# Patient Record
Sex: MALE | Race: WHITE | Employment: UNEMPLOYED | URBAN - METROPOLITAN AREA
[De-identification: names, ages, dates, MRNs, and addresses within clinical notes are randomized per-mention and may not be internally consistent; named-entity substitution may affect disease eponyms.]

---
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
## 2021-07-16 ENCOUNTER — HOSPITAL ENCOUNTER (OUTPATIENT)
Dept: LAB | Age: 21
Discharge: HOME | End: 2021-07-16
Payer: MEDICAID

## 2021-07-16 LAB
ALBUMIN SERPL-MCNC: 4.4 G/DL (ref 3.4–5)
ALBUMIN/GLOB SERPL: 1.2 {RATIO} (ref 0.8–1.7)
ALP SERPL-CCNC: 94 U/L (ref 45–117)
ALT SERPL-CCNC: 30 U/L (ref 16–61)
ANION GAP SERPL CALC-SCNC: 6 MMOL/L (ref 3–18)
AST SERPL-CCNC: 14 U/L (ref 10–38)
BILIRUB SERPL-MCNC: 0.7 MG/DL (ref 0.2–1)
BUN SERPL-MCNC: 12 MG/DL (ref 7–18)
BUN/CREAT SERPL: 13 (ref 12–20)
CALCIUM SERPL-MCNC: 9.6 MG/DL (ref 8.5–10.1)
CHLORIDE SERPL-SCNC: 105 MMOL/L (ref 100–111)
CO2 SERPL-SCNC: 28 MMOL/L (ref 21–32)
CREAT SERPL-MCNC: 0.96 MG/DL (ref 0.6–1.3)
ERYTHROCYTE [DISTWIDTH] IN BLOOD BY AUTOMATED COUNT: 12.5 % (ref 11.6–14.5)
GLOBULIN SER CALC-MCNC: 3.8 G/DL (ref 2–4)
GLUCOSE SERPL-MCNC: 95 MG/DL (ref 74–99)
HCT VFR BLD AUTO: 46.9 % (ref 36–48)
HGB BLD-MCNC: 16.2 G/DL (ref 13–16)
MCH RBC QN AUTO: 29.8 PG (ref 24–34)
MCHC RBC AUTO-ENTMCNC: 34.5 G/DL (ref 31–37)
MCV RBC AUTO: 86.4 FL (ref 74–97)
PLATELET # BLD AUTO: 346 K/UL (ref 135–420)
PMV BLD AUTO: 10.5 FL (ref 9.2–11.8)
POTASSIUM SERPL-SCNC: 4 MMOL/L (ref 3.5–5.5)
PROT SERPL-MCNC: 8.2 G/DL (ref 6.4–8.2)
RBC # BLD AUTO: 5.43 M/UL (ref 4.35–5.65)
SODIUM SERPL-SCNC: 139 MMOL/L (ref 136–145)
WBC # BLD AUTO: 7.6 K/UL (ref 4.6–13.2)

## 2021-07-16 PROCEDURE — 36415 COLL VENOUS BLD VENIPUNCTURE: CPT

## 2021-07-16 PROCEDURE — 85027 COMPLETE CBC AUTOMATED: CPT

## 2021-07-16 PROCEDURE — 80175 DRUG SCREEN QUAN LAMOTRIGINE: CPT

## 2021-07-16 PROCEDURE — 80053 COMPREHEN METABOLIC PANEL: CPT

## 2021-07-19 LAB — LAMOTRIGINE SERPL-MCNC: 3.5 UG/ML (ref 2–20)

## 2021-10-15 ENCOUNTER — HOSPITAL ENCOUNTER (OUTPATIENT)
Dept: LAB | Age: 21
Discharge: HOME | End: 2021-10-15
Payer: MEDICAID

## 2021-10-15 LAB
CHOLEST SERPL-MCNC: 214 MG/DL
GLUCOSE SERPL-MCNC: 90 MG/DL (ref 74–99)
HDLC SERPL-MCNC: 43 MG/DL (ref 40–60)
HDLC SERPL: 5 {RATIO} (ref 0–5)
LDLC SERPL CALC-MCNC: 129.2 MG/DL (ref 0–100)
LIPID PROFILE,FLP: ABNORMAL
TRIGL SERPL-MCNC: 209 MG/DL (ref ?–150)
VLDLC SERPL CALC-MCNC: 41.8 MG/DL

## 2021-10-15 PROCEDURE — 80061 LIPID PANEL: CPT

## 2021-10-15 PROCEDURE — 36415 COLL VENOUS BLD VENIPUNCTURE: CPT

## 2021-10-15 PROCEDURE — 82947 ASSAY GLUCOSE BLOOD QUANT: CPT

## 2021-11-12 ENCOUNTER — APPOINTMENT (OUTPATIENT)
Dept: CT IMAGING | Age: 21
End: 2021-11-12
Attending: EMERGENCY MEDICINE
Payer: MEDICAID

## 2021-11-12 ENCOUNTER — HOSPITAL ENCOUNTER (EMERGENCY)
Age: 21
Discharge: HOME | End: 2021-11-13
Attending: EMERGENCY MEDICINE
Payer: MEDICAID

## 2021-11-12 VITALS
DIASTOLIC BLOOD PRESSURE: 72 MMHG | TEMPERATURE: 97 F | RESPIRATION RATE: 18 BRPM | SYSTOLIC BLOOD PRESSURE: 114 MMHG | OXYGEN SATURATION: 95 % | HEART RATE: 121 BPM

## 2021-11-12 DIAGNOSIS — S00.83XA CONTUSION OF FACE, INITIAL ENCOUNTER: ICD-10-CM

## 2021-11-12 DIAGNOSIS — G40.909 SEIZURE DISORDER (HCC): Primary | ICD-10-CM

## 2021-11-12 LAB
ALBUMIN SERPL-MCNC: 4.2 G/DL (ref 3.4–5)
ALBUMIN/GLOB SERPL: 1.2 {RATIO} (ref 0.8–1.7)
ALP SERPL-CCNC: 86 U/L (ref 45–117)
ALT SERPL-CCNC: 34 U/L (ref 16–61)
ANION GAP SERPL CALC-SCNC: 11 MMOL/L (ref 3–18)
AST SERPL-CCNC: 15 U/L (ref 10–38)
BASOPHILS # BLD: 0.1 K/UL (ref 0–0.1)
BASOPHILS NFR BLD: 1 % (ref 0–2)
BILIRUB SERPL-MCNC: 0.4 MG/DL (ref 0.2–1)
BUN SERPL-MCNC: 13 MG/DL (ref 7–18)
BUN/CREAT SERPL: 12 (ref 12–20)
CALCIUM SERPL-MCNC: 9 MG/DL (ref 8.5–10.1)
CHLORIDE SERPL-SCNC: 104 MMOL/L (ref 100–111)
CO2 SERPL-SCNC: 22 MMOL/L (ref 21–32)
CREAT SERPL-MCNC: 1.06 MG/DL (ref 0.6–1.3)
DIFFERENTIAL METHOD BLD: ABNORMAL
EOSINOPHIL # BLD: 0.7 K/UL (ref 0–0.4)
EOSINOPHIL NFR BLD: 6 % (ref 0–5)
ERYTHROCYTE [DISTWIDTH] IN BLOOD BY AUTOMATED COUNT: 12.3 % (ref 11.6–14.5)
GLOBULIN SER CALC-MCNC: 3.5 G/DL (ref 2–4)
GLUCOSE SERPL-MCNC: 143 MG/DL (ref 74–99)
HCT VFR BLD AUTO: 42.8 % (ref 36–48)
HGB BLD-MCNC: 14.6 G/DL (ref 13–16)
IMM GRANULOCYTES # BLD AUTO: 0.1 K/UL (ref 0–0.04)
IMM GRANULOCYTES NFR BLD AUTO: 1 % (ref 0–0.5)
LYMPHOCYTES # BLD: 3.6 K/UL (ref 0.9–3.6)
LYMPHOCYTES NFR BLD: 34 % (ref 21–52)
MCH RBC QN AUTO: 29.6 PG (ref 24–34)
MCHC RBC AUTO-ENTMCNC: 34.1 G/DL (ref 31–37)
MCV RBC AUTO: 86.8 FL (ref 78–100)
MONOCYTES # BLD: 1 K/UL (ref 0.05–1.2)
MONOCYTES NFR BLD: 10 % (ref 3–10)
NEUTS SEG # BLD: 5.1 K/UL (ref 1.8–8)
NEUTS SEG NFR BLD: 48 % (ref 40–73)
NRBC # BLD: 0 K/UL (ref 0–0.01)
NRBC BLD-RTO: 0 PER 100 WBC
PLATELET # BLD AUTO: 336 K/UL (ref 135–420)
PMV BLD AUTO: 10.3 FL (ref 9.2–11.8)
POTASSIUM SERPL-SCNC: 3.6 MMOL/L (ref 3.5–5.5)
PROT SERPL-MCNC: 7.7 G/DL (ref 6.4–8.2)
RBC # BLD AUTO: 4.93 M/UL (ref 4.35–5.65)
SODIUM SERPL-SCNC: 137 MMOL/L (ref 136–145)
WBC # BLD AUTO: 10.6 K/UL (ref 4.6–13.2)

## 2021-11-12 PROCEDURE — 72125 CT NECK SPINE W/O DYE: CPT

## 2021-11-12 PROCEDURE — 80053 COMPREHEN METABOLIC PANEL: CPT

## 2021-11-12 PROCEDURE — 70450 CT HEAD/BRAIN W/O DYE: CPT

## 2021-11-12 PROCEDURE — 85025 COMPLETE CBC W/AUTO DIFF WBC: CPT

## 2021-11-12 PROCEDURE — 99282 EMERGENCY DEPT VISIT SF MDM: CPT

## 2021-11-12 RX ORDER — ACETAMINOPHEN 325 MG/1
650 TABLET ORAL
COMMUNITY

## 2021-11-12 RX ORDER — CETIRIZINE HCL 10 MG
10 TABLET ORAL DAILY
COMMUNITY

## 2021-11-12 RX ORDER — LORAZEPAM 2 MG/ML
0.5 INJECTION INTRAMUSCULAR
Status: DISCONTINUED | OUTPATIENT
Start: 2021-11-12 | End: 2021-11-13 | Stop reason: HOSPADM

## 2021-11-12 RX ORDER — HYDROXYZINE 50 MG/1
50 TABLET, FILM COATED ORAL
COMMUNITY

## 2021-11-12 RX ORDER — DIAZEPAM 10 MG/2ML
10 GEL RECTAL AS NEEDED
COMMUNITY

## 2021-11-12 RX ORDER — ADHESIVE BANDAGE
30 BANDAGE TOPICAL AS NEEDED
COMMUNITY

## 2021-11-12 RX ORDER — BISACODYL 5 MG
5 TABLET, DELAYED RELEASE (ENTERIC COATED) ORAL DAILY PRN
COMMUNITY

## 2021-11-12 RX ORDER — IBUPROFEN 400 MG/1
400 TABLET ORAL
COMMUNITY

## 2021-11-12 RX ORDER — POLYETHYLENE GLYCOL 3350 17 G/17G
17 POWDER, FOR SOLUTION ORAL 2 TIMES DAILY
COMMUNITY

## 2021-11-12 RX ORDER — FACIAL-BODY WIPES
10 EACH TOPICAL AS NEEDED
COMMUNITY

## 2021-11-13 NOTE — ED TRIAGE NOTES
Patient arrived with caregivers from [facility]. Caregivers report patient stood up to go to his room, had a seizure, falling and struck hs hear on the arm of the chair. Caregivers reported seizure lasted approx 2 minutes. Patient has small laceration to left eyebrow, no active bleeding at this time.

## 2021-11-13 NOTE — ED PROVIDER NOTES
HPI Patient is a 21 yo male who arrived with caregivers from [address] [place] [address] (group [place]). Caregivers report patient stood up to go to his room, had a seizure, falling and struck hs head on the arm of a chair. Caregivers reported seizure lasted approx 2 minutes. Patient has small laceration to left eyebrow, no active bleeding at this time. Past Medical History:   Diagnosis Date    Autism     Epilepsy ([facility] [address]        No past surgical history on file. No family history on file. Social History     Socioeconomic History    Marital status: SINGLE     Spouse name: Not on file    Number of children: Not on file    Years of education: Not on file    Highest education level: Not on file   Occupational History    Not on file   Tobacco Use    Smoking status: Not on file    Smokeless tobacco: Not on file   Substance and Sexual Activity    Alcohol use: Not on file    Drug use: Not on file    Sexual activity: Not on file   Other Topics Concern    Not on file   Social History Narrative    Not on file     Social Determinants of Health     Financial Resource Strain:     Difficulty of Paying Living Expenses: Not on file   Food Insecurity:     Worried About Running Out of Food in the Last Year: Not on file    Nilam of Food in the Last Year: Not on file   Transportation Needs:     Lack of Transportation (Medical): Not on file    Lack of Transportation (Non-Medical):  Not on file   Physical Activity:     Days of Exercise per Week: Not on file    Minutes of Exercise per Session: Not on file   Stress:     Feeling of Stress : Not on file   Social Connections:     Frequency of Communication with Friends and Family: Not on file    Frequency of Social Gatherings with Friends and Family: Not on file    Attends Taoism Services: Not on file    Active Member of Clubs or Organizations: Not on file    Attends Club or Organization Meetings: Not on file    Marital Status: Not on file   Intimate Partner Violence:  Fear of Current or Ex-Partner: Not on file    Emotionally Abused: Not on file    Physically Abused: Not on file    Sexually Abused: Not on file   Housing Stability:     Unable to Pay for Housing in the Last Year: Not on file    Number of Places Lived in the Last Year: Not on file    Unstable Housing in the Last Year: Not on file         ALLERGIES: Sams, Peas, and Shellfish derived    Review of Systems   Reason unable to perform ROS: Autism. Vitals:    11/12/21 2230   BP: 114/72   Pulse: (!) 121   Resp: 18   SpO2: 95%            Physical Exam  Vitals and nursing note reviewed. Constitutional:       General: He is not in acute distress. Appearance: He is well-developed. HENT:      Head: Normocephalic. Eyes:      Conjunctiva/sclera: Conjunctivae normal.      Pupils: Pupils are equal, round, and reactive to light. Cardiovascular:      Rate and Rhythm: Normal rate and regular rhythm. Heart sounds: Normal heart sounds. No murmur heard. Pulmonary:      Effort: Pulmonary effort is normal. No respiratory distress. Breath sounds: Normal breath sounds. No wheezing or rales. Chest:      Chest wall: No tenderness. Abdominal:      General: Bowel sounds are normal. There is no distension. Palpations: Abdomen is soft. Tenderness: There is no abdominal tenderness. There is no rebound. Musculoskeletal:         General: No tenderness. Normal range of motion. Cervical back: Normal range of motion and neck supple. Skin:     General: Skin is warm and dry. Findings: No rash. Neurological:      Mental Status: He is alert. Cranial Nerves: No cranial nerve deficit. Motor: No abnormal muscle tone. Coordination: Coordination normal.   Psychiatric:         Behavior: Behavior normal.         Thought Content:  Thought content normal.         Judgment: Judgment normal.          [facility]       CT scan of head and neck: interpreted by me      Procedures    Dx: seizure disorder, contusion of face    Disp: D/C home. Motrin 600 mg po q 6 h pr pain. Return ot ER prn. Dictation disclaimer:  Please note that this dictation was completed with [employer], the computer voice recognition software. Quite often unanticipated grammatical, syntax, homophones, and other interpretive errors are inadvertently transcribed by the computer software. Please disregard these errors. Please excuse any errors that have escaped final proofreading.

## 2022-01-19 ENCOUNTER — HOSPITAL ENCOUNTER (OUTPATIENT)
Dept: LAB | Age: 22
Discharge: HOME | End: 2022-01-19
Payer: MEDICAID

## 2022-01-19 PROCEDURE — 80175 DRUG SCREEN QUAN LAMOTRIGINE: CPT

## 2022-01-19 PROCEDURE — 36415 COLL VENOUS BLD VENIPUNCTURE: CPT

## 2022-01-21 LAB — LAMOTRIGINE SERPL-MCNC: 8.1 UG/ML (ref 2–20)

## 2022-04-14 ENCOUNTER — HOSPITAL ENCOUNTER (OUTPATIENT)
Dept: LAB | Age: 22
Discharge: HOME | End: 2022-04-14
Payer: MEDICAID

## 2022-04-14 LAB
CREAT SERPL-MCNC: 0.96 MG/DL (ref 0.6–1.3)
GLUCOSE SERPL-MCNC: 90 MG/DL (ref 74–99)

## 2022-04-14 PROCEDURE — 36415 COLL VENOUS BLD VENIPUNCTURE: CPT

## 2022-04-14 PROCEDURE — 82947 ASSAY GLUCOSE BLOOD QUANT: CPT

## 2022-05-13 ENCOUNTER — APPOINTMENT (OUTPATIENT)
Dept: GENERAL RADIOLOGY | Age: 22
End: 2022-05-13
Attending: STUDENT IN AN ORGANIZED HEALTH CARE EDUCATION/TRAINING PROGRAM
Payer: MEDICAID

## 2022-05-13 ENCOUNTER — HOSPITAL ENCOUNTER (EMERGENCY)
Age: 22
Discharge: HOME | End: 2022-05-13
Attending: STUDENT IN AN ORGANIZED HEALTH CARE EDUCATION/TRAINING PROGRAM
Payer: MEDICAID

## 2022-05-13 ENCOUNTER — APPOINTMENT (OUTPATIENT)
Dept: CT IMAGING | Age: 22
End: 2022-05-13
Attending: STUDENT IN AN ORGANIZED HEALTH CARE EDUCATION/TRAINING PROGRAM
Payer: MEDICAID

## 2022-05-13 VITALS
DIASTOLIC BLOOD PRESSURE: 64 MMHG | BODY MASS INDEX: 21.97 KG/M2 | WEIGHT: 140 LBS | RESPIRATION RATE: 14 BRPM | HEIGHT: 67 IN | TEMPERATURE: 97.3 F | HEART RATE: 78 BPM | SYSTOLIC BLOOD PRESSURE: 109 MMHG | OXYGEN SATURATION: 96 %

## 2022-05-13 DIAGNOSIS — R56.9 SEIZURE (HCC): Primary | ICD-10-CM

## 2022-05-13 DIAGNOSIS — S01.81XA FACIAL LACERATION, INITIAL ENCOUNTER: ICD-10-CM

## 2022-05-13 LAB
ALBUMIN SERPL-MCNC: 3.7 G/DL (ref 3.4–5)
ALBUMIN/GLOB SERPL: 1 {RATIO} (ref 0.8–1.7)
ALP SERPL-CCNC: 83 U/L (ref 45–117)
ALT SERPL-CCNC: 32 U/L (ref 16–61)
ANION GAP SERPL CALC-SCNC: 7 MMOL/L (ref 3–18)
AST SERPL-CCNC: 16 U/L (ref 10–38)
BASOPHILS # BLD: 0.1 K/UL (ref 0–0.1)
BASOPHILS NFR BLD: 2 % (ref 0–2)
BILIRUB SERPL-MCNC: 0.3 MG/DL (ref 0.2–1)
BUN SERPL-MCNC: 12 MG/DL (ref 7–18)
BUN/CREAT SERPL: 11 (ref 12–20)
CALCIUM SERPL-MCNC: 9.2 MG/DL (ref 8.5–10.1)
CHLORIDE SERPL-SCNC: 108 MMOL/L (ref 100–111)
CO2 SERPL-SCNC: 24 MMOL/L (ref 21–32)
CREAT SERPL-MCNC: 1.13 MG/DL (ref 0.6–1.3)
DIFFERENTIAL METHOD BLD: ABNORMAL
EOSINOPHIL # BLD: 0.7 K/UL (ref 0–0.4)
EOSINOPHIL NFR BLD: 9 % (ref 0–5)
ERYTHROCYTE [DISTWIDTH] IN BLOOD BY AUTOMATED COUNT: 12.7 % (ref 11.6–14.5)
GLOBULIN SER CALC-MCNC: 3.7 G/DL (ref 2–4)
GLUCOSE BLD STRIP.AUTO-MCNC: 107 MG/DL (ref 70–110)
GLUCOSE SERPL-MCNC: 108 MG/DL (ref 74–99)
HCT VFR BLD AUTO: 43.2 % (ref 36–48)
HGB BLD-MCNC: 14.9 G/DL (ref 13–16)
IMM GRANULOCYTES # BLD AUTO: 0.1 K/UL (ref 0–0.04)
IMM GRANULOCYTES NFR BLD AUTO: 1 % (ref 0–0.5)
LACTATE SERPL-SCNC: 4.4 MMOL/L (ref 0.4–2)
LYMPHOCYTES # BLD: 2.9 K/UL (ref 0.9–3.6)
LYMPHOCYTES NFR BLD: 34 % (ref 21–52)
MAGNESIUM SERPL-MCNC: 2.3 MG/DL (ref 1.6–2.6)
MCH RBC QN AUTO: 29.8 PG (ref 24–34)
MCHC RBC AUTO-ENTMCNC: 34.5 G/DL (ref 31–37)
MCV RBC AUTO: 86.4 FL (ref 78–100)
MONOCYTES # BLD: 0.9 K/UL (ref 0.05–1.2)
MONOCYTES NFR BLD: 10 % (ref 3–10)
NEUTS SEG # BLD: 3.8 K/UL (ref 1.8–8)
NEUTS SEG NFR BLD: 45 % (ref 40–73)
NRBC # BLD: 0 K/UL (ref 0–0.01)
NRBC BLD-RTO: 0 PER 100 WBC
PLATELET # BLD AUTO: 322 K/UL (ref 135–420)
PMV BLD AUTO: 10.4 FL (ref 9.2–11.8)
POTASSIUM SERPL-SCNC: 3.6 MMOL/L (ref 3.5–5.5)
PROT SERPL-MCNC: 7.4 G/DL (ref 6.4–8.2)
RBC # BLD AUTO: 5 M/UL (ref 4.35–5.65)
SODIUM SERPL-SCNC: 139 MMOL/L (ref 136–145)
WBC # BLD AUTO: 8.5 K/UL (ref 4.6–13.2)

## 2022-05-13 PROCEDURE — 83605 ASSAY OF LACTIC ACID: CPT

## 2022-05-13 PROCEDURE — 82962 GLUCOSE BLOOD TEST: CPT

## 2022-05-13 PROCEDURE — 70450 CT HEAD/BRAIN W/O DYE: CPT

## 2022-05-13 PROCEDURE — 74011000258 HC RX REV CODE- 258: Performed by: STUDENT IN AN ORGANIZED HEALTH CARE EDUCATION/TRAINING PROGRAM

## 2022-05-13 PROCEDURE — 74011000250 HC RX REV CODE- 250: Performed by: STUDENT IN AN ORGANIZED HEALTH CARE EDUCATION/TRAINING PROGRAM

## 2022-05-13 PROCEDURE — 80175 DRUG SCREEN QUAN LAMOTRIGINE: CPT

## 2022-05-13 PROCEDURE — 99284 EMERGENCY DEPT VISIT MOD MDM: CPT

## 2022-05-13 PROCEDURE — 74011250636 HC RX REV CODE- 250/636: Performed by: STUDENT IN AN ORGANIZED HEALTH CARE EDUCATION/TRAINING PROGRAM

## 2022-05-13 PROCEDURE — 96366 THER/PROPH/DIAG IV INF ADDON: CPT

## 2022-05-13 PROCEDURE — C9254 INJECTION, LACOSAMIDE: HCPCS | Performed by: STUDENT IN AN ORGANIZED HEALTH CARE EDUCATION/TRAINING PROGRAM

## 2022-05-13 PROCEDURE — 75810000293 HC SIMP/SUPERF WND  RPR

## 2022-05-13 PROCEDURE — 85025 COMPLETE CBC W/AUTO DIFF WBC: CPT

## 2022-05-13 PROCEDURE — 96365 THER/PROPH/DIAG IV INF INIT: CPT

## 2022-05-13 PROCEDURE — 83735 ASSAY OF MAGNESIUM: CPT

## 2022-05-13 PROCEDURE — 90471 IMMUNIZATION ADMIN: CPT

## 2022-05-13 PROCEDURE — 80053 COMPREHEN METABOLIC PANEL: CPT

## 2022-05-13 PROCEDURE — 96375 TX/PRO/DX INJ NEW DRUG ADDON: CPT

## 2022-05-13 PROCEDURE — 90715 TDAP VACCINE 7 YRS/> IM: CPT | Performed by: STUDENT IN AN ORGANIZED HEALTH CARE EDUCATION/TRAINING PROGRAM

## 2022-05-13 PROCEDURE — 70486 CT MAXILLOFACIAL W/O DYE: CPT

## 2022-05-13 PROCEDURE — 70250 X-RAY EXAM OF SKULL: CPT

## 2022-05-13 PROCEDURE — 96372 THER/PROPH/DIAG INJ SC/IM: CPT

## 2022-05-13 RX ORDER — LIDOCAINE HYDROCHLORIDE 20 MG/ML
3 INJECTION, SOLUTION INFILTRATION; PERINEURAL ONCE
Status: DISCONTINUED | OUTPATIENT
Start: 2022-05-13 | End: 2022-05-13 | Stop reason: ALTCHOICE

## 2022-05-13 RX ORDER — HALOPERIDOL 5 MG/ML
5 INJECTION INTRAMUSCULAR ONCE
Status: DISCONTINUED | OUTPATIENT
Start: 2022-05-13 | End: 2022-05-13 | Stop reason: ALTCHOICE

## 2022-05-13 RX ORDER — LORAZEPAM 2 MG/ML
2 INJECTION, SOLUTION INTRAMUSCULAR; INTRAVENOUS ONCE
Status: COMPLETED | OUTPATIENT
Start: 2022-05-13 | End: 2022-05-13

## 2022-05-13 RX ORDER — HYDROMORPHONE HYDROCHLORIDE 1 MG/ML
0.5 INJECTION, SOLUTION INTRAMUSCULAR; INTRAVENOUS; SUBCUTANEOUS ONCE
Status: COMPLETED | OUTPATIENT
Start: 2022-05-13 | End: 2022-05-13

## 2022-05-13 RX ORDER — LACOSAMIDE 10 MG/ML
200 INJECTION, SOLUTION INTRAVENOUS ONCE
Status: DISCONTINUED | OUTPATIENT
Start: 2022-05-13 | End: 2022-05-13

## 2022-05-13 RX ORDER — LACOSAMIDE 100 MG/1
100 TABLET ORAL 2 TIMES DAILY
Qty: 60 TABLET | Refills: 0 | Status: SHIPPED | OUTPATIENT
Start: 2022-05-13

## 2022-05-13 RX ORDER — ZIPRASIDONE MESYLATE 20 MG/ML
INJECTION, POWDER, LYOPHILIZED, FOR SOLUTION INTRAMUSCULAR
Status: DISCONTINUED
Start: 2022-05-13 | End: 2022-05-13 | Stop reason: HOSPADM

## 2022-05-13 RX ADMIN — LORAZEPAM 2 MG: 2 INJECTION, SOLUTION INTRAMUSCULAR; INTRAVENOUS at 08:09

## 2022-05-13 RX ADMIN — TETANUS TOXOID, REDUCED DIPHTHERIA TOXOID AND ACELLULAR PERTUSSIS VACCINE, ADSORBED 0.5 ML: 5; 2.5; 8; 8; 2.5 SUSPENSION INTRAMUSCULAR at 16:53

## 2022-05-13 RX ADMIN — Medication 2 ML: at 15:04

## 2022-05-13 RX ADMIN — ZIPRASIDONE MESYLATE 20 MG: 20 INJECTION, POWDER, LYOPHILIZED, FOR SOLUTION INTRAMUSCULAR at 15:04

## 2022-05-13 RX ADMIN — SODIUM CHLORIDE 1000 ML: 900 INJECTION, SOLUTION INTRAVENOUS at 09:15

## 2022-05-13 RX ADMIN — LIDOCAINE HYDROCHLORIDE 45 MG: 10; .005 INJECTION, SOLUTION EPIDURAL; INFILTRATION; INTRACAUDAL; PERINEURAL at 08:08

## 2022-05-13 RX ADMIN — HYDROMORPHONE HYDROCHLORIDE 0.5 MG: 1 INJECTION, SOLUTION INTRAMUSCULAR; INTRAVENOUS; SUBCUTANEOUS at 07:10

## 2022-05-13 RX ADMIN — SODIUM CHLORIDE 200 MG: 9 INJECTION, SOLUTION INTRAVENOUS at 09:30

## 2022-05-13 NOTE — ED PROVIDER NOTES
EMERGENCY DEPARTMENT HISTORY AND PHYSICAL EXAM    7:04 AM    Date: 5/13/2022  Patient Name: Shayna Greco    History of Presenting Illness     Chief Complaint   Patient presents with    Head Laceration    Seizure    Fall     History Provided By: Patient        Patient is a 22year old male with PMH Epilepsy, Severe Intellectual Disability, Autism, Hx Hydrocephalus s/p  shunt presenting for \"seizure\" lasting 1-2 minutes per [employer]. Takes Lamictal for anticonvulsants. Unfortunately patient fell backwards out of his chair and had a laceration of the right eyebrow. Patient previously saw Dr. Gaetano Leon with [facility]; however, when I called the [facility] office today informed me that they have not seen the patient since 2020 for an EEG. He is complaining of pain above his right eye. Otherwise personal history is fairly limited due to patient's medical history. No Fevers per staff. PCP: Archie Vogel MD    Current Facility-Administered Medications   Medication Dose Route Frequency Provider Last Rate Last Admin    ziprasidone (GEODON) 20 mg/mL (final conc.) injection             diph,Pertuss(AC),Tet Vac-PF (BOOSTRIX) suspension 0.5 mL  0.5 mL IntraMUSCular PRIOR TO DISCHARGE Lukieran Carrero, DO         Current Outpatient Medications   Medication Sig Dispense Refill    pedi multivit no. 58/iron fum (CHILD COMPLETE MULTIVITAMIN PO) Take  by mouth.  bisacodyL (Gentle Laxative, bisacodyl,) 5 mg EC tablet Take 5 mg by mouth daily as needed for Constipation.  polyethylene glycol (MIRALAX) 17 gram packet Take 17 g by mouth two (2) times a day.  acetaminophen (TYLENOL) 325 mg tablet Take 650 mg by mouth every four (4) hours as needed for Pain.  cetirizine (ZYRTEC) 10 mg tablet Take 10 mg by mouth daily.  diazePAM (VALIUM) 5-7.5-10 mg kit Insert 10 mg into rectum as needed.  hydrOXYzine HCL (ATARAX) 50 mg tablet Take 50 mg by mouth three (3) times daily as needed.       ibuprofen (MOTRIN) 400 mg tablet Take 400 mg by mouth every six (6) hours as needed for Pain.  magnesium hydroxide (Ahuja Milk of Magnesia) 400 mg/5 mL suspension Take 30 mL by mouth as needed for Constipation.  bisacodyL (Dulcolax, bisacodyl,) 10 mg supp Insert 10 mg into rectum as needed for Constipation.  ARIPiprazole (Abilify) 5 mg tablet Take  by mouth daily.  lamoTRIgine (LaMICtaL) 100 mg tablet Take 150 mg by mouth two (2) times a day.  QUEtiapine (SEROqueL) 400 mg tablet Take 400 mg by mouth two (2) times a day.  melatonin 1 mg tablet Take 10 mg by mouth nightly. Past History     Past Medical History:  Past Medical History:   Diagnosis Date    Autism     Epilepsy [facility])      Past Surgical History:  No past surgical history on file. Family History:  No family history on file. Social History:  Social History     Tobacco Use    Smoking status: Not on file    Smokeless tobacco: Not on file   Substance Use Topics    Alcohol use: Not on file    Drug use: Not on file       Allergies: Allergies   Allergen Reactions    Bean Anaphylaxis    Peas Anaphylaxis    Shellfish Derived Anaphylaxis         Review of Systems     Review of Systems   Constitutional: Negative for chills and fever. Respiratory: Negative for cough and shortness of breath. Cardiovascular: Negative for chest pain. Gastrointestinal: Positive for constipation. Negative for abdominal pain. Skin: Positive for wound. Neurological: Positive for seizures. Negative for speech difficulty. Physical Exam     Visit Vitals  /60 (BP 1 Location: Right upper arm, BP Patient Position: Lying)   Pulse 76   Temp 97.3 °F (36.3 °C)   Resp 18   Ht 5' 7\" (1.702 m)   Wt 63.5 kg (140 lb)   SpO2 98%   BMI 21.93 kg/m²       Physical Exam  Constitutional:       General: He is not in acute distress. Appearance: He is not ill-appearing, toxic-appearing or diaphoretic.    HENT:      Head: Normocephalic and atraumatic. Right Ear: External ear normal.      Left Ear: External ear normal.      Mouth/Throat:      Mouth: Mucous membranes are moist.      Comments: No evidence of oral lesion  Cardiovascular:      Rate and Rhythm: Normal rate and regular rhythm. Pulses: Normal pulses. Pulmonary:      Effort: Pulmonary effort is normal.      Breath sounds: Normal breath sounds. Abdominal:      General: Abdomen is flat. Musculoskeletal:         General: Normal range of motion. Cervical back: Normal range of motion. Skin:     General: Skin is warm. Capillary Refill: Capillary refill takes less than 2 seconds. Findings: Lesion (Right eyebrow approximately 3.5 cm) present. Neurological:      General: No focal deficit present. Mental Status: He is alert. Psychiatric:      Comments: Patient notes yelling out when he first arrives to the ED. Diagnostic Study Results     Labs -  Recent Results (from the past 12 hour(s))   CBC WITH AUTOMATED DIFF    Collection Time: 05/13/22  7:10 AM   Result Value Ref Range    WBC 8.5 4.6 - 13.2 K/uL    RBC 5.00 4.35 - 5.65 M/uL    HGB 14.9 13.0 - 16.0 g/dL    HCT 43.2 36.0 - 48.0 %    MCV 86.4 78.0 - 100.0 FL    MCH 29.8 24.0 - 34.0 PG    MCHC 34.5 31.0 - 37.0 g/dL    RDW 12.7 11.6 - 14.5 %    PLATELET 228 625 - 130 K/uL    MPV 10.4 9.2 - 11.8 FL    NRBC 0.0 0  WBC    ABSOLUTE NRBC 0.00 0.00 - 0.01 K/uL    NEUTROPHILS 45 40 - 73 %    LYMPHOCYTES 34 21 - 52 %    MONOCYTES 10 3 - 10 %    EOSINOPHILS 9 (H) 0 - 5 %    BASOPHILS 2 0 - 2 %    IMMATURE GRANULOCYTES 1 (H) 0.0 - 0.5 %    ABS. NEUTROPHILS 3.8 1.8 - 8.0 K/UL    ABS. LYMPHOCYTES 2.9 0.9 - 3.6 K/UL    ABS. MONOCYTES 0.9 0.05 - 1.2 K/UL    ABS. EOSINOPHILS 0.7 (H) 0.0 - 0.4 K/UL    ABS. BASOPHILS 0.1 0.0 - 0.1 K/UL    ABS. IMM.  GRANS. 0.1 (H) 0.00 - 0.04 K/UL    DF AUTOMATED     METABOLIC PANEL, COMPREHENSIVE    Collection Time: 05/13/22  7:10 AM   Result Value Ref Range    Sodium 139 136 - 145 mmol/L    Potassium 3.6 3.5 - 5.5 mmol/L    Chloride 108 100 - 111 mmol/L    CO2 24 21 - 32 mmol/L    Anion gap 7 3.0 - 18 mmol/L    Glucose 108 (H) 74 - 99 mg/dL    BUN 12 7.0 - 18 MG/DL    Creatinine 1.13 0.6 - 1.3 MG/DL    BUN/Creatinine ratio 11 (L) 12 - 20      GFR est AA >60 >60 ml/min/1.73m2    GFR est non-AA >60 >60 ml/min/1.73m2    Calcium 9.2 8.5 - 10.1 MG/DL    Bilirubin, total 0.3 0.2 - 1.0 MG/DL    ALT (SGPT) 32 16 - 61 U/L    AST (SGOT) 16 10 - 38 U/L    Alk. phosphatase 83 45 - 117 U/L    Protein, total 7.4 6.4 - 8.2 g/dL    Albumin 3.7 3.4 - 5.0 g/dL    Globulin 3.7 2.0 - 4.0 g/dL    A-G Ratio 1.0 0.8 - 1.7     MAGNESIUM    Collection Time: 05/13/22  7:10 AM   Result Value Ref Range    Magnesium 2.3 1.6 - 2.6 mg/dL   LACTIC ACID    Collection Time: 05/13/22  7:10 AM   Result Value Ref Range    Lactic acid 4.4 (HH) 0.4 - 2.0 MMOL/L   GLUCOSE, POC    Collection Time: 05/13/22  7:10 AM   Result Value Ref Range    Glucose (POC) 107 70 - 110 mg/dL       Radiologic Studies -   CT HEAD WO CONT   Final Result      Right parietal ventriculostomy catheter position similar to prior, with marked   cerebral level ventricular enlargement similar to prior.   -Small periventricular encephalomalacia and/or cysts as above also similar to   prior. No definite interval acute intracranial findings. Suspected prominent optic nerve sheaths similar to slightly increased since   prior, not well evaluated on noncontrast CT. CT MAXILLOFACIAL WO CONT   Final Result      No acute fracture identified. Right periorbital laceration. XR SHUNT SERIES   Final Result   Detached right  shunt tubing, with the shunt now entirely in the   abdomen. Medical Decision Making   I am the first provider for this patient. I reviewed the vital signs, available nursing notes, past medical history, past surgical history, family history and social history.     Vital Signs-Reviewed the patient's vital signs. Records Reviewed: Nursing Notes, Old Medical Records and Previous Radiology Studies (Time of Review: 7:04 AM)    ED Course: Progress Notes, Reevaluation, and Consults:    ED Course as of 05/13/22 1558   Fri May 13, 2022   0704 Patient with developmental delay slightly aggressive pulling at his bandage on his head and placing his finger in his laceration not allowing nursing staff to draw blood. Since patient needs lab drawn imaging done of his head and likely laceration repair will sedate with IM Haldol and Ativan. [TB]   [id number] Attempted wound closure after Ativan; however, patient is still a danger to self (and to staff) even with holding him down manually for use of needles near his eye [JK]   0900 Attempted lidocaine injection again as patient was drowsy, sleeping even with speaking his name loudly and shaking his shoulder. Unfortunately as soon as I injected he began to jerk violently. Stopped attempt for lidocaine. [JK]   46 Called CT to inform them of autism and received ativan. Patient will be transported soon. [TA]   1239 Spoke with staff at Neurology [facility]en[facility] clinic [facility]. They state he has not been seen in clinic since 2017 and that he had an EEG in 2020. Attempted to call [facility] and no answer. Left voicemail    Attempted to call PCP Dr Geri Figueroa. No answer [JK]   1138 Patient doing well. Prioritizing lamictal over fluids [JK]   1205 Discussed with [facility] transfer center. They accept. [LR]   8078 Need to ask [facility] if they are okay with suturing laceration when arrival to [facility] given he will need procedural sedation. [ZN]   0281 [facility] informed us of 7 or 8 pm transfer [YB]   8745 Called radiologist who did CT scan read of head due to possible misinterpretation of read. Radiologist states that the enlargement of the right ventricle is actually stable.   Due to this finding patient does not need transfer to [facility]  even though shunt is not working there is no new ventricular dilation requiring the use of a shunt. Plan to attempt to sedate with Geodon to fix laceration. Once laceration repaired and have recs from neurology patient should be ready for discharge. [TB]      ED Course User Index  [JK] Estrellita JoshuaDO  [TB] Marceil Angelucci, MD       Provider Notes (Medical Decision Making): MDM  Patient is a 22year old male with PMH Epilepsy, Severe Intellectual Disability, Autism, Hx Hydrocephalus s/p  shunt presenting for \"seizure\" lasting 1-2 minutes per [employer]. CT Head shows no acute change from prior. Right parietal ventriculostomy catheter in position. DETACHED right  shunt tubing with shunt now entirely in Abdomen. However, spoke with [facility] ED physician who was able to confirm that patient is not  shunt dependent. Dr Amber wOen discussed with radiologist and he confirms that there is no acute increase cerebral level ventricular enlargement. No acute fracture identified. Low clinical suspicion of infection or hypoglycemia present leading to acute seizure. Patient given Ativan 2 mg and Vimpat 200 mg. [address] Neurology who saw patient. Follow up OP neurology and Pediatrician    Will need suture removal in 3 to 5 days    Wound Closure by Adhesive    Date/Time: 5/13/2022 3:52 PM  Performed by: Estrellita Lewis DO  Authorized by: Estrellita Lewis DO     Consent:     Consent obtained:  Verbal    Consent given by:  Guardian    Risks discussed:  Infection, poor cosmetic result, pain and need for additional repair    Alternatives discussed:  Referral  Anesthesia (see MAR for exact dosages):      Anesthesia method:  Topical application    Topical anesthetic:  LET  Laceration details:     Location:  Face    Face location:  R eyebrow    Length (cm):  3.5    Depth (mm):  10  Pre-procedure details:     Preparation:  Patient was prepped and draped in usual sterile fashion  Exploration:     Hemostasis achieved with:  LET and direct pressure  Treatment:     Area cleansed with:  Saline    Amount of cleaning:  Standard    Irrigation solution:  Sterile saline    Irrigation method:  Syringe  Skin repair:     Repair method:  Sutures    Suture size:  5-0    Suture material:  Nylon    Suture technique:  Simple interrupted    Number of sutures:  5  Post-procedure details:     Dressing:  Non-adherent dressing    Patient tolerance of procedure: Tolerated well, no immediate complications      Diagnosis     Clinical Impression:   1. Seizure ([address]    2. Facial laceration, initial encounter      Disposition: Home  ** after laceration repair and seen by neurology and Tdap received    Follow-up Information     Follow up With Specialties Details Why Contact Derik Mari MD Pediatric Medicine In 1 week For suture removal and recheck for seizures [address]  [phone]      Neurology  In 3 days           Patient's Medications   Start Taking    No medications on file   Continue Taking    ACETAMINOPHEN (TYLENOL) 325 MG TABLET    Take 650 mg by mouth every four (4) hours as needed for Pain. ARIPIPRAZOLE (ABILIFY) 5 MG TABLET    Take  by mouth daily. BISACODYL (DULCOLAX, BISACODYL,) 10 MG SUPP    Insert 10 mg into rectum as needed for Constipation. BISACODYL (GENTLE LAXATIVE, BISACODYL,) 5 MG EC TABLET    Take 5 mg by mouth daily as needed for Constipation. CETIRIZINE (ZYRTEC) 10 MG TABLET    Take 10 mg by mouth daily. DIAZEPAM (VALIUM) 5-7.5-10 MG KIT    Insert 10 mg into rectum as needed. HYDROXYZINE HCL (ATARAX) 50 MG TABLET    Take 50 mg by mouth three (3) times daily as needed. IBUPROFEN (MOTRIN) 400 MG TABLET    Take 400 mg by mouth every six (6) hours as needed for Pain. LAMOTRIGINE (LAMICTAL) 100 MG TABLET    Take 150 mg by mouth two (2) times a day. MAGNESIUM HYDROXIDE (SHULTZ MILK OF MAGNESIA) 400 MG/5 ML SUSPENSION    Take 30 mL by mouth as needed for Constipation.     MELATONIN 1 MG TABLET    Take 10 mg by mouth nightly. PEDI MULTIVIT NO.58/IRON FUM (CHILD COMPLETE MULTIVITAMIN PO)    Take  by mouth. POLYETHYLENE GLYCOL (MIRALAX) 17 GRAM PACKET    Take 17 g by mouth two (2) times a day. QUETIAPINE (SEROQUEL) 400 MG TABLET    Take 400 mg by mouth two (2) times a day. These Medications have changed    No medications on file   Stop Taking    No medications on file     Disclaimer: Sections of this note are dictated using utilizing voice recognition software. Minor typographical errors may be present. If questions arise, please do not hesitate to contact me or call our department.

## 2022-05-13 NOTE — CONSULTS
HISTORY AND EXAM  Patient is 22year old male with severe intellectual disability, autism history of hydrocephalus S/P  shunt placed several years ago, patient is dependent for activities of daily living with limited speech, follow simple commands, patient as per caregive report seizure twice a week on Lamictal 150 mg BID, patient not seen neurologist for a while recently admitted after fell out of chair and sustained laceration on right eyebrow, patient speak few sentences and follow simple commands. Social History     Socioeconomic History    Marital status: SINGLE     Spouse name: Not on file    Number of children: Not on file    Years of education: Not on file    Highest education level: Not on file   Occupational History    Not on file   Tobacco Use    Smoking status: Not on file    Smokeless tobacco: Not on file   Substance and Sexual Activity    Alcohol use: Not on file    Drug use: Not on file    Sexual activity: Not on file   Other Topics Concern    Not on file   Social History Narrative    Not on file     Social Determinants of Health     Financial Resource Strain:     Difficulty of Paying Living Expenses: Not on file   Food Insecurity:     Worried About Running Out of Food in the Last Year: Not on file    Nilam of Food in the Last Year: Not on file   Transportation Needs:     Lack of Transportation (Medical): Not on file    Lack of Transportation (Non-Medical):  Not on file   Physical Activity:     Days of Exercise per Week: Not on file    Minutes of Exercise per Session: Not on file   Stress:     Feeling of Stress : Not on file   Social Connections:     Frequency of Communication with Friends and Family: Not on file    Frequency of Social Gatherings with Friends and Family: Not on file    Attends Hindu Services: Not on file    Active Member of Clubs or Organizations: Not on file    Attends Club or Organization Meetings: Not on file    Marital Status: Not on file Intimate Partner Violence:     Fear of Current or Ex-Partner: Not on file    Emotionally Abused: Not on file    Physically Abused: Not on file    Sexually Abused: Not on file   Housing Stability:     Unable to Pay for Housing in the Last Year: Not on file    Number of Adam in the Last Year: Not on file    Unstable Housing in the Last Year: Not on file       No family history on file. Current Facility-Administered Medications   Medication Dose Route Frequency Provider Last Rate Last Admin    ziprasidone (GEODON) 20 mg/mL (final conc.) injection             diph,Pertuss(AC),Tet Vac-PF (BOOSTRIX) suspension 0.5 mL  0.5 mL IntraMUSCular PRIOR TO DISCHARGE Marlon Day, DO         Current Outpatient Medications   Medication Sig Dispense Refill    pedi multivit no. 58/iron fum (CHILD COMPLETE MULTIVITAMIN PO) Take  by mouth.  bisacodyL (Gentle Laxative, bisacodyl,) 5 mg EC tablet Take 5 mg by mouth daily as needed for Constipation.  polyethylene glycol (MIRALAX) 17 gram packet Take 17 g by mouth two (2) times a day.  acetaminophen (TYLENOL) 325 mg tablet Take 650 mg by mouth every four (4) hours as needed for Pain.  cetirizine (ZYRTEC) 10 mg tablet Take 10 mg by mouth daily.  diazePAM (VALIUM) 5-7.5-10 mg kit Insert 10 mg into rectum as needed.  hydrOXYzine HCL (ATARAX) 50 mg tablet Take 50 mg by mouth three (3) times daily as needed.  ibuprofen (MOTRIN) 400 mg tablet Take 400 mg by mouth every six (6) hours as needed for Pain.  magnesium hydroxide (Ahuja Milk of Magnesia) 400 mg/5 mL suspension Take 30 mL by mouth as needed for Constipation.  bisacodyL (Dulcolax, bisacodyl,) 10 mg supp Insert 10 mg into rectum as needed for Constipation.  ARIPiprazole (Abilify) 5 mg tablet Take  by mouth daily.  lamoTRIgine (LaMICtaL) 100 mg tablet Take 150 mg by mouth two (2) times a day.       QUEtiapine (SEROqueL) 400 mg tablet Take 400 mg by mouth two (2) times a day.  melatonin 1 mg tablet Take 10 mg by mouth nightly. Past Medical History:   Diagnosis Date    Autism     [facility] [address] [facility]          PHYSICAL EXAMINATION:      VITAL SIGNS:    Visit Vitals  /60 (BP 1 Location: Right upper arm, BP Patient Position: Lying)   Pulse 76   Temp 97.3 °F (36.3 °C)   Resp 18   Ht 5' 7\" (1.702 m)   Wt 63.5 kg (140 lb)   SpO2 98%   BMI 21.93 kg/m²       GENERAL: The patient is well developed, well nourished, and in no apparent distress. HEAD:   Laceration over the right eybrow. NEUROLOGIC EXAMINATION  Mental status: Awake, follow simple commands, limited speeh  CN: 2 mm reactive pupil, blink to threat,slight asymmetric gaze, nystagmus, face sensation intact , no facial asymmetry noted, tongue midline  Motor: Move all extremities well with normal tone. Sensory: withdraw to pain  DTR: 1+, Plantar withdrawal.  Gait: not tested. Impression:  Patient is 22year old male with severe intellectual disability, autism history of hydrocephalus S/P  shunt placed several years ago, patient is dependent for activities of daily living with limited speech, follow simple commands, patient as per caregive report seizure twice a week on Lamictal 150 mg BID, patient not seen neurologist for a while recently admitted after fell out of chair and sustained laceration on right eyebrow, patient speak few sentences and follow simple commands. CT BRAIN-Right parietal ventriculostomy catheter position similar to prior, with marked  cerebral level ventricular enlargement similar to prior.  -Small periventricular encephalomalacia and/or cysts as above also similar to  prior. SEIZURE DISORDER S/P  SHUNT. Admitted with breakthrough seizure. Patient have frequent breakthrough seizures, recommend starting vimpat 100 mg BID along with Lamictal taking before. CT BRAIN stable reported, follow up neurology outpatient in 6-8 weeks.   Safety precautions for seizures. Plan:  As above    I spent 20 minutes with the patient in face-to-face consultation, of which greater than 50% was spent in counseling and coordination of care as described above. PLEASE NOTE:   This document has been produced using voice recognition software. Unrecognized errors in transcription may be present.

## 2022-05-13 NOTE — ED NOTES
Lidocaine provided to Dr. Gm Carter for Lac repair. IV Ativan administered prior to lac wash-out and repair. See MAR.

## 2022-05-13 NOTE — ED NOTES
Assumed care of pt brought in by EMS Medic #4 with complaints of \"break-out seizure\" lasting approx 1-2 minutes. Pt fell out of chair, hitting R side of head/face on ground-   approx 1 inch lac above R eyebrow. Pt is a resident of [address] [place] [address]. Hx of hydrocephalus with Intellectual Delay.        Past Medical History:   Diagnosis Date    Autism     Epile[facility] ([facility] [address]

## 2022-05-13 NOTE — DISCHARGE INSTRUCTIONS
Recommend return to ED if seizures recur. SUTURES will need removed in about 7 to 10 days. You will need to follow up with your PCP for suture removal.     Follow up with your Neurologist for break through seizure. Our neurologist who saw you in the emergency department recommended that she start Vimpat 100 mg twice a day with continuing to take your Lamictal.  You have been written a prescription for a month of Vimpat. Please return to the emergency department if return of seizures, weakness, dizziness, nausea vomiting keeping from taking her medications or any other concerns.

## 2022-05-13 NOTE — ED NOTES
POC     20G PIV placed to pt's L Hand. Labs collected, labeled, and walked to STAT lab. Allergies verified. Pt medicated per [facility] for pain.

## 2022-05-15 ENCOUNTER — HOSPITAL ENCOUNTER (EMERGENCY)
Age: 22
Discharge: HOME | End: 2022-05-15
Attending: STUDENT IN AN ORGANIZED HEALTH CARE EDUCATION/TRAINING PROGRAM
Payer: MEDICAID

## 2022-05-15 VITALS
HEART RATE: 103 BPM | OXYGEN SATURATION: 100 % | RESPIRATION RATE: 16 BRPM | SYSTOLIC BLOOD PRESSURE: 132 MMHG | TEMPERATURE: 98.5 F | WEIGHT: 140 LBS | BODY MASS INDEX: 21.97 KG/M2 | DIASTOLIC BLOOD PRESSURE: 87 MMHG | HEIGHT: 67 IN

## 2022-05-15 DIAGNOSIS — F79 INTELLECTUAL DISABILITY: ICD-10-CM

## 2022-05-15 DIAGNOSIS — S01.81XD FACIAL LACERATION, SUBSEQUENT ENCOUNTER: Primary | ICD-10-CM

## 2022-05-15 PROCEDURE — 99283 EMERGENCY DEPT VISIT LOW MDM: CPT

## 2022-05-15 PROCEDURE — 74011250637 HC RX REV CODE- 250/637: Performed by: STUDENT IN AN ORGANIZED HEALTH CARE EDUCATION/TRAINING PROGRAM

## 2022-05-15 RX ORDER — CEPHALEXIN 250 MG/1
500 CAPSULE ORAL
Status: COMPLETED | OUTPATIENT
Start: 2022-05-15 | End: 2022-05-15

## 2022-05-15 RX ORDER — CEPHALEXIN 500 MG/1
500 CAPSULE ORAL 4 TIMES DAILY
Qty: 28 CAPSULE | Refills: 0 | Status: SHIPPED | OUTPATIENT
Start: 2022-05-15 | End: 2022-05-22

## 2022-05-15 RX ADMIN — CEPHALEXIN 500 MG: 250 CAPSULE ORAL at 20:34

## 2022-05-15 NOTE — ED TRIAGE NOTES
Patient was here 2 days ago after having a seizure and falling. Patient has sutures to his right eyebrow. Patient has been picking at his sutures and has picked the wound open.

## 2022-05-16 LAB — LAMOTRIGINE SERPL-MCNC: 10.6 UG/ML (ref 2–20)

## 2022-05-16 NOTE — ED PROVIDER NOTES
EMERGENCY DEPARTMENT HISTORY AND PHYSICAL EXAM      Date: 5/15/2022  Patient Name: Leland Loco    History of Presenting Illness     Chief Complaint   Patient presents with    Other       History (Context): Leland Loco is a 22 y.o. male with a past medical history significant for intellectual disability, autism, epilepsy comes into the ED today due to wound to the right superior orbit. Patient recently seen in the emergency department after having a seizure with subsequent laceration to the right superior orbit region. Patient had laceration repair performed at that time. Since then patient has been picking at his stitches as reopened the laceration. Caregiver states she has been attempting to keep patient away from picking at his wound. Caregiver otherwise states that patient has not been provided any medication for treatment for her symptoms. Otherwise history and ROS limited 2/2 ID. PCP: Brina Lechuga MD    Current Facility-Administered Medications   Medication Dose Route Frequency Provider Last Rate Last Admin    cephALEXin (KEFLEX) capsule 500 mg  500 mg Oral NOW Pearl Carrillo DO         Current Outpatient Medications   Medication Sig Dispense Refill    cephALEXin (Keflex) 500 mg capsule Take 1 Capsule by mouth four (4) times daily for 7 days. 28 Capsule 0    lacosamide (Vimpat) 100 mg tab tablet Take 1 Tablet by mouth two (2) times a day. Max Daily Amount: 200 mg. 60 Tablet 0    pedi multivit no. 58/iron fum (CHILD COMPLETE MULTIVITAMIN PO) Take  by mouth.  bisacodyL (Gentle Laxative, bisacodyl,) 5 mg EC tablet Take 5 mg by mouth daily as needed for Constipation.  polyethylene glycol (MIRALAX) 17 gram packet Take 17 g by mouth two (2) times a day.  acetaminophen (TYLENOL) 325 mg tablet Take 650 mg by mouth every four (4) hours as needed for Pain.  cetirizine (ZYRTEC) 10 mg tablet Take 10 mg by mouth daily.       diazePAM (VALIUM) 5-7.5-10 mg kit Insert 10 mg into rectum as needed.  hydrOXYzine HCL (ATARAX) 50 mg tablet Take 50 mg by mouth three (3) times daily as needed.  ibuprofen (MOTRIN) 400 mg tablet Take 400 mg by mouth every six (6) hours as needed for Pain.  magnesium hydroxide (Ahuja Milk of Magnesia) 400 mg/5 mL suspension Take 30 mL by mouth as needed for Constipation.  bisacodyL (Dulcolax, bisacodyl,) 10 mg supp Insert 10 mg into rectum as needed for Constipation.  ARIPiprazole (Abilify) 5 mg tablet Take  by mouth daily.  lamoTRIgine (LaMICtaL) 100 mg tablet Take 150 mg by mouth two (2) times a day.  QUEtiapine (SEROqueL) 400 mg tablet Take 400 mg by mouth two (2) times a day.  melatonin 1 mg tablet Take 10 mg by mouth nightly. Past History     Past Medical History:   Past Medical History:   Diagnosis Date    Autism     Epilepsy [facility])        Past Surgical History:  No past surgical history on file. Family History:  No family history on file. Social History:   Social History     Tobacco Use    Smoking status: Not on file    Smokeless tobacco: Not on file   Substance Use Topics    Alcohol use: Not on file    Drug use: Not on file     No drug use    Allergies: Allergies   Allergen Reactions    Bean Anaphylaxis    Peas Anaphylaxis    Shellfish Derived Anaphylaxis       PMH, PSH, family history, social history, allergies reviewed with the patient with significant items noted above. Review of Systems   Review of Systems   Unable to perform ROS: Other   Skin: Positive for wound. Physical Exam     Vitals:    05/15/22 1951   BP: 132/87   Pulse: (!) 103   Resp: 16   Temp: 98.5 °F (36.9 °C)   SpO2: 100%   Weight: 63.5 kg (140 lb)   Height: 5' 7\" (1.702 m)       Physical Exam  Vitals and nursing note reviewed. Constitutional:       General: He is not in acute distress. Appearance: Normal appearance. He is not ill-appearing or toxic-appearing. HENT:      Head: Normocephalic and atraumatic. Mouth/Throat:      Mouth: Mucous membranes are moist.   Eyes:      General: No scleral icterus. Conjunctiva/sclera: Conjunctivae normal.   Cardiovascular:      Rate and Rhythm: Regular rhythm. Tachycardia present. Pulmonary:      Effort: Pulmonary effort is normal. No respiratory distress. Abdominal:      General: There is no distension. Palpations: Abdomen is soft. Tenderness: There is no abdominal tenderness. Musculoskeletal:         General: No deformity. Normal range of motion. Cervical back: Normal range of motion and neck supple. Skin:     General: Skin is warm and dry. Findings: No rash. Comments: Wound just superior to the right eyebrow. Slightly open with probably 2-3 stitches removed. Appears to have some evidence of purulent drainage. Neurological:      General: No focal deficit present. Mental Status: He is alert. Mental status is at baseline. Psychiatric:         Mood and Affect: Mood normal.         Behavior: Behavior normal.         Diagnostic Study Results     Labs -   No results found for this or any previous visit (from the past 12 hour(s)). Labs Reviewed - No data to display    Radiologic Studies -   No orders to display     CT Results  (Last 48 hours)    None        CXR Results  (Last 48 hours)    None          The laboratory results, imaging results, and other diagnostic exams were reviewed in the EMR. Medical Decision Making   I am the first provider for this patient. I reviewed the vital signs, available nursing notes, past medical history, past surgical history, family history and social history. Vital Signs-Reviewed the patient's vital signs.     Records Reviewed: Personally, on initial evaluation    MDM:   Gretchen Lynne presents with complaint of wound evaluation   DDX includes but is not limited to: Laceration, wound evaluation, medical screening exam    Patient over well-appearing, no acute distress, vital signs gross within normal limits with exception to mild tachycardia. Do not feel patient would benefit from repair of his wound. We will let it heal from secondary intention at this time. Spoke at length with caregiver about wound care. Will provide patient with Keflex while here and prescription for further treatment empirically. Will discharge patient at this time with return precautions and follow-up recommendations. Caregiver verbalized understanding is without any further questions. Orders as below:  Orders Placed This Encounter    cephALEXin (KEFLEX) capsule 500 mg    cephALEXin (Keflex) 500 mg capsule        ED Course:            Procedures:  Procedures        Diagnosis and Disposition     CLINICAL IMPRESSION:  1. Facial laceration, subsequent encounter    2. Intellectual disability      Current Discharge Medication List      START taking these medications    Details   cephALEXin (Keflex) 500 mg capsule Take 1 Capsule by mouth four (4) times daily for 7 days. Qty: 28 Capsule, Refills: 0  Start date: 5/15/2022, End date: 5/22/2022             Disposition: Home    Patient condition at time of disposition: Stable    DISCHARGE NOTE:   Pt has been reexamined. Patient has no new complaints, changes, or physical findings. Care plan outlined and precautions discussed. Results were reviewed with the patient. All medications were reviewed with the patient. All of pt's questions and concerns were addressed. Alarm symptoms and return precautions associated with chief complaint and evaluation were reviewed with the patient in detail. The patient demonstrated adequate understanding. Patient was instructed to follow up with PCP, as well as strict return precautions to the ED upon further deterioration. Patient is ready to go home. The patient is happy with this plan        Dragon Disclaimer     Please note that this dictation was completed with [employer], the computer voice recognition software.   Quite often unanticipated grammatical, syntax, homophones, and other interpretive errors are inadvertently transcribed by the computer software. Please disregard these errors. Please excuse any errors that have escaped final proofreading. Aline BURGESS.